# Patient Record
Sex: FEMALE | Race: WHITE | NOT HISPANIC OR LATINO | URBAN - METROPOLITAN AREA
[De-identification: names, ages, dates, MRNs, and addresses within clinical notes are randomized per-mention and may not be internally consistent; named-entity substitution may affect disease eponyms.]

---

## 2017-11-02 ENCOUNTER — OUTPATIENT (OUTPATIENT)
Dept: OUTPATIENT SERVICES | Facility: HOSPITAL | Age: 62
LOS: 1 days | End: 2017-11-02

## 2019-05-15 ENCOUNTER — TRANSCRIPTION ENCOUNTER (OUTPATIENT)
Age: 64
End: 2019-05-15

## 2020-03-02 ENCOUNTER — APPOINTMENT (OUTPATIENT)
Dept: ULTRASOUND IMAGING | Facility: CLINIC | Age: 65
End: 2020-03-02
Payer: MEDICARE

## 2020-03-02 PROCEDURE — 76700 US EXAM ABDOM COMPLETE: CPT

## 2022-10-25 PROBLEM — Z00.00 ENCOUNTER FOR PREVENTIVE HEALTH EXAMINATION: Status: ACTIVE | Noted: 2022-10-25

## 2022-11-11 ENCOUNTER — RESULT REVIEW (OUTPATIENT)
Age: 67
End: 2022-11-11

## 2022-11-28 ENCOUNTER — APPOINTMENT (OUTPATIENT)
Dept: CARDIOLOGY | Facility: CLINIC | Age: 67
End: 2022-11-28
Payer: MEDICARE

## 2022-11-28 ENCOUNTER — NON-APPOINTMENT (OUTPATIENT)
Age: 67
End: 2022-11-28

## 2022-11-28 VITALS
HEART RATE: 73 BPM | WEIGHT: 150 LBS | HEIGHT: 62 IN | DIASTOLIC BLOOD PRESSURE: 84 MMHG | TEMPERATURE: 96.9 F | SYSTOLIC BLOOD PRESSURE: 138 MMHG | OXYGEN SATURATION: 96 % | BODY MASS INDEX: 27.6 KG/M2

## 2022-11-28 DIAGNOSIS — Z78.9 OTHER SPECIFIED HEALTH STATUS: ICD-10-CM

## 2022-11-28 DIAGNOSIS — Z82.49 FAMILY HISTORY OF ISCHEMIC HEART DISEASE AND OTHER DISEASES OF THE CIRCULATORY SYSTEM: ICD-10-CM

## 2022-11-28 PROCEDURE — 93000 ELECTROCARDIOGRAM COMPLETE: CPT

## 2022-11-28 PROCEDURE — 93242 EXT ECG>48HR<7D RECORDING: CPT | Mod: 59

## 2022-11-28 PROCEDURE — 99204 OFFICE O/P NEW MOD 45 MIN: CPT | Mod: 25

## 2022-11-28 NOTE — HISTORY OF PRESENT ILLNESS
[FreeTextEntry1] : Joanna is a 67-year-old female with history of hyperlipidemia, hypertriglyceridemia, hypothyroidism, dizziness, family history premature CAD SUPA mother age 70.\par \par No history of CAD, MI, revascularization, VHD, CHF, TIA, CVA, diabetes, PVD, DVT, PE, arrhythmia, AF.\par \par Patient has palpitations and dizziness with exercise with heart rates up to 120s.  Cardiovascular review of symptoms is negative for exertional chest pain, dyspnea or syncope.  No PND or orthopnea leg edema.  No bleeding or black stool.\par \par No exercise routine.  Patient is walking 30 minutes on occasion.  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

## 2022-11-28 NOTE — ASSESSMENT
[FreeTextEntry1] : Joanna is a 67-year-old female with medical history detailed above and active medical issues including:\par \par - Palpitations, dizziness, abnormal baseline EKG, multiple CAD risk factors.  Coronary CTA and coronary calcium score ordered to access for obstructive CAD and risk stratification.  Echocardiogram ordered to evaluate for structural heart disease, carotid and abdominal ultrasound to evaluate for PAD.  Zio patch 1 week heart monitor started today.\par \par - Hyperlipidemia, hypertriglyceridemia, declines starting statin therapy.  Coronary calcium score ordered to assess for risk stratification.  Repeat labs ordered.\par \par - Hypothyroid on Synthroid\par \par - Family history of premature CAD mother SUPA age 70\par \par Advised patient to follow active lifestyle with regular cardiovascular exercise. Patient educated on heart healthy diet. Recommend increased oral hydration with electrolyte suppliment drinks, avoid excess alcohol and caffeine.  Patient is aware to call with any symptoms or concerns.\par \par Cardiology follow-up after noninvasive testing.\par \par \par

## 2022-12-08 ENCOUNTER — APPOINTMENT (OUTPATIENT)
Dept: ENDOCRINOLOGY | Facility: CLINIC | Age: 67
End: 2022-12-08

## 2022-12-08 VITALS
OXYGEN SATURATION: 98 % | TEMPERATURE: 97.6 F | DIASTOLIC BLOOD PRESSURE: 80 MMHG | BODY MASS INDEX: 27.79 KG/M2 | HEIGHT: 62 IN | SYSTOLIC BLOOD PRESSURE: 138 MMHG | HEART RATE: 60 BPM | WEIGHT: 151 LBS

## 2022-12-08 PROCEDURE — 99204 OFFICE O/P NEW MOD 45 MIN: CPT

## 2022-12-08 NOTE — HISTORY OF PRESENT ILLNESS
[FreeTextEntry1] : pt is coming in coming in for a consultation on hypothyroidism. \par This is a pleasant 67-year-old white female who has a past medical history of a primary hypothyroidism presents for initial evaluation of her thyroid test.  According to the patient she has a history of thyroid disease for the past 16 years and was maintained on Synthroid 125 mcg tablets daily.  However she had a blood test done in September which showed that the TSH was abnormal at 0.176 and as a result of this her dose was reduced to 125 mcg 6 days in a week by her PCP.  Had a repeat level drawn in November and that showed that the TSH level is now within normal range.  Patient claimed that she is good she is compliant with her medication but she had an episode where she lost approximately 8 pounds in September.  She was feeling very anxious.  She denies any palpitations chest pain difficulty swallowing or hoarseness.  She did notice increased hair loss and heat intolerance.  Patient is physically active and does exercise on a daily basis.  There is no history of any head or neck radiation skin rash or joint pains.  Patient claimed that she does not take any other prescribed medication but she does have a history of elevated cholesterol  for which she is being followed by the cardiologist.  And reports that there is a strong family history of thyroid disease both her parents has had thyroid problem and she has a sister also who has a thyroid issue.

## 2022-12-08 NOTE — PHYSICAL EXAM
[Alert] : alert [Well Nourished] : well nourished [Healthy Appearance] : healthy appearance [Normal Sclera/Conjunctiva] : normal sclera/conjunctiva [PERRL] : pupils equal, round and reactive to light [Normal Outer Ear/Nose] : the ears and nose were normal in appearance [No Neck Mass] : no neck mass was observed [Thyroid Not Enlarged] : the thyroid was not enlarged [No Respiratory Distress] : no respiratory distress [Clear to Auscultation] : lungs were clear to auscultation bilaterally [Normal S1, S2] : normal S1 and S2 [Normal Rate] : heart rate was normal [Regular Rhythm] : with a regular rhythm [Carotids Normal] : carotid pulses were normal with no bruits [No Edema] : no peripheral edema [Not Tender] : non-tender [Soft] : abdomen soft [No HSM] : no hepato-splenomegaly [Normal Supraclavicular Nodes] : no supraclavicular lymphadenopathy [Normal Anterior Cervical Nodes] : no anterior cervical lymphadenopathy [Normal Posterior Cervical Nodes] : no posterior cervical lymphadenopathy [No CVA Tenderness] : no ~M costovertebral angle tenderness [No Stigmata of Cushings Syndrome] : no stigmata of Cushings Syndrome [Normal Gait] : normal gait [No Clubbing, Cyanosis] : no clubbing  or cyanosis of the fingernails [No Joint Swelling] : no joint swelling seen [No Rash] : no rash [No Skin Lesions] : no skin lesions [No Motor Deficits] : the motor exam was normal [Normal Reflexes] : deep tendon reflexes were 2+ and symmetric [No Tremors] : no tremors [Oriented x3] : oriented to person, place, and time [de-identified] : Deferred [FreeTextEntry1] : Deferred [de-identified] : Deferred [de-identified] : Normal body hair

## 2022-12-08 NOTE — REVIEW OF SYSTEMS
[Fatigue] : fatigue [Recent Weight Gain (___ Lbs)] : recent weight gain: [unfilled] lbs [Heat Intolerance] : heat intolerance [Negative] : Heme/Lymph

## 2022-12-08 NOTE — ASSESSMENT
[FreeTextEntry1] : Middle-aged white female who has a past medical history of primary hypothyroidism maintained for a long period of time on Synthroid 125 mcg tablets daily.  However she had a blood test done on September 29, 2022 which showed a suppressed TSH of 0.176 her medication was adjusted and it was changed to 1 tablet 6 days in a week repeat lab test from 11/15/2022 show a normal TSH of 1.120 Free T3 of 3.0 and a free T4 of 1.6.  Patient is well compliant with her medication.  She is now clinically euthyroid.  Recommendation\par 1.  I have advised the patient to continue with the Synthroid 125 mcg tablets from Monday to Saturday and to skip on Sundays.\par 2 we will repeat her thyroid profile in approximately 2 months time after which she will return to the office for a follow-up evaluation.  The plan was discussed in detail with the patient thank you Breath sounds clear and equal bilaterally.

## 2022-12-14 ENCOUNTER — APPOINTMENT (OUTPATIENT)
Dept: CARDIOLOGY | Facility: CLINIC | Age: 67
End: 2022-12-14
Payer: MEDICARE

## 2022-12-14 PROCEDURE — 93244 EXT ECG>48HR<7D REV&INTERPJ: CPT

## 2022-12-28 ENCOUNTER — APPOINTMENT (OUTPATIENT)
Dept: CARDIOLOGY | Facility: CLINIC | Age: 67
End: 2022-12-28

## 2023-01-24 ENCOUNTER — APPOINTMENT (OUTPATIENT)
Dept: CARDIOLOGY | Facility: CLINIC | Age: 68
End: 2023-01-24
Payer: MEDICARE

## 2023-01-24 PROCEDURE — 93306 TTE W/DOPPLER COMPLETE: CPT

## 2023-01-26 ENCOUNTER — APPOINTMENT (OUTPATIENT)
Dept: CARDIOLOGY | Facility: CLINIC | Age: 68
End: 2023-01-26
Payer: MEDICARE

## 2023-01-26 VITALS
BODY MASS INDEX: 46.46 KG/M2 | TEMPERATURE: 97.8 F | SYSTOLIC BLOOD PRESSURE: 168 MMHG | HEART RATE: 72 BPM | OXYGEN SATURATION: 95 % | DIASTOLIC BLOOD PRESSURE: 90 MMHG | WEIGHT: 254 LBS

## 2023-01-26 VITALS — DIASTOLIC BLOOD PRESSURE: 80 MMHG | SYSTOLIC BLOOD PRESSURE: 130 MMHG

## 2023-01-26 PROCEDURE — 99215 OFFICE O/P EST HI 40 MIN: CPT

## 2023-01-26 NOTE — ASSESSMENT
[FreeTextEntry1] : Joanna is a 67-year-old female with medical history detailed above and active medical issues including:\par \par - No anginal symptoms, nonobstructive coronary CTA, moderate coronary calcium score started on statin therapy, repeat labs ordered\par \par - Hyperlipidemia, hypertriglyceridemia, start statin therapy.  \par \par - MVP, mild-moderate MR, normal LVEF, echo Jan 2023\par \par - Hypothyroid on Synthroid\par \par - Family history of premature CAD mother SUPA age 70\par \par Advised patient to follow active lifestyle with regular cardiovascular exercise. Patient educated on heart healthy diet. Recommend increased oral hydration with electrolyte suppliment drinks, avoid excess alcohol and caffeine.  Patient is aware to call with any symptoms or concerns.\par \par Cardiology follow-up 1 year same-day echocardiogram\par \par Joanna will follow up with Dr. Urmila Nails for primary care\par \par \par

## 2023-01-26 NOTE — HISTORY OF PRESENT ILLNESS
[FreeTextEntry1] : Joanna is a 67-year-old female with history of hyperlipidemia, hypertriglyceridemia, hypothyroidism, dizziness, family history premature CAD SUPA mother age 70.\par \par No history of CAD, MI, revascularization, VHD, CHF, TIA, CVA, diabetes, PVD, DVT, PE, arrhythmia, AF.\par \par Patient has dizziness with head position likely vertigo.  Patient has palpitations and dizziness with exercise with heart rates up to 120s.  Cardiovascular review of symptoms is negative for exertional chest pain, dyspnea or syncope.  No PND or orthopnea leg edema.  No bleeding or black stool.\par \par No exercise routine.  Patient is walking 30 minutes on occasion.  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 \par \par EKG Nov 2022 sinus rhythm PRWP, age undetermined anterior MI

## 2023-02-14 ENCOUNTER — APPOINTMENT (OUTPATIENT)
Dept: ENDOCRINOLOGY | Facility: CLINIC | Age: 68
End: 2023-02-14
Payer: MEDICARE

## 2023-02-14 VITALS
HEART RATE: 68 BPM | DIASTOLIC BLOOD PRESSURE: 82 MMHG | HEIGHT: 62 IN | TEMPERATURE: 96.4 F | SYSTOLIC BLOOD PRESSURE: 134 MMHG | WEIGHT: 151 LBS | OXYGEN SATURATION: 95 % | BODY MASS INDEX: 27.79 KG/M2

## 2023-02-14 PROCEDURE — 99213 OFFICE O/P EST LOW 20 MIN: CPT

## 2023-02-14 RX ORDER — ROSUVASTATIN CALCIUM 10 MG/1
10 TABLET, FILM COATED ORAL
Qty: 90 | Refills: 3 | Status: DISCONTINUED | COMMUNITY
Start: 2023-01-26 | End: 2023-02-14

## 2023-02-14 NOTE — ASSESSMENT
[FreeTextEntry1] : Middle-aged white female who with a past medical history of primary hypothyroidism and who is currently stable and euthyroid.  She recently had a blood test done on January 23 her TSH is 1.110, free T4 is 1.76, TPO antibody slightly elevated at 96.  She also had a sonogram of the thyroid in November 2022 which showed 2 unchanged thyroid nodules on the right side all measuring between 0.5 to 1 cm in size with no focal calcifications.  The thyroid gland itself is very atrophic and heterogeneous.  I suspect that the patient most likely has chronic Hashimoto's disease which probably is the cause of his underlying thyroid condition.  Recommendation\par 1.  Patient is advised to continue her current Synthroid 125 mcg tablets daily from Monday to Saturday except on Sundays.\par 2.  The importance of diet exercise and weight loss was again stressed upon the patient.\par 3.  Patient will obtain a repeat blood test in approximately 6 months time and will return for follow-up evaluation.  She will also require a follow-up sonogram of the thyroid by the end of the year.\par The plan was discussed in detail with the patient.
none

## 2023-02-14 NOTE — HISTORY OF PRESENT ILLNESS
[FreeTextEntry1] : 67-year-old white female who has a past medical history of primary hypothyroidism mild hyperlipidemia who is currently maintained on Synthroid 125 mcg tablets 6 days a week.  Patient presents for routine follow-up and evaluation.  Patient denies any significant symptoms except for weight loss of 15 pounds for the past 1 year on a strict diet and exercise regimen.  Otherwise her review of systems is negative.  She denies difficulty swallowing neck pain or hoarseness.  She claims that she has been sleeping much better her energy level is good and she is walking and exercising on a daily basis.  She does complain of occasional heat intolerance.  Her bowel habits are normal.  She is compliant with the medication and takes it on a daily basis in the morning

## 2023-03-01 ENCOUNTER — NON-APPOINTMENT (OUTPATIENT)
Age: 68
End: 2023-03-01

## 2023-03-01 ENCOUNTER — APPOINTMENT (OUTPATIENT)
Dept: OPHTHALMOLOGY | Facility: CLINIC | Age: 68
End: 2023-03-01
Payer: MEDICARE

## 2023-03-01 PROCEDURE — 92004 COMPRE OPH EXAM NEW PT 1/>: CPT

## 2023-07-12 ENCOUNTER — RX RENEWAL (OUTPATIENT)
Age: 68
End: 2023-07-12

## 2023-09-13 ENCOUNTER — APPOINTMENT (OUTPATIENT)
Dept: ENDOCRINOLOGY | Facility: CLINIC | Age: 68
End: 2023-09-13

## 2023-09-20 ENCOUNTER — APPOINTMENT (OUTPATIENT)
Dept: ENDOCRINOLOGY | Facility: CLINIC | Age: 68
End: 2023-09-20
Payer: MEDICARE

## 2023-09-20 VITALS
HEART RATE: 86 BPM | SYSTOLIC BLOOD PRESSURE: 138 MMHG | OXYGEN SATURATION: 95 % | DIASTOLIC BLOOD PRESSURE: 90 MMHG | HEIGHT: 62 IN | WEIGHT: 151 LBS | TEMPERATURE: 98.1 F | BODY MASS INDEX: 27.79 KG/M2 | RESPIRATION RATE: 14 BRPM

## 2023-09-20 PROCEDURE — 99213 OFFICE O/P EST LOW 20 MIN: CPT

## 2023-09-20 RX ORDER — LEVOTHYROXINE SODIUM 125 UG/1
125 TABLET ORAL DAILY
Qty: 90 | Refills: 3 | Status: ACTIVE | COMMUNITY
Start: 2023-09-20 | End: 1900-01-01

## 2023-09-20 RX ORDER — LEVOTHYROXINE SODIUM 0.12 MG/1
125 TABLET ORAL
Qty: 90 | Refills: 1 | Status: DISCONTINUED | COMMUNITY
Start: 2023-07-12 | End: 2023-09-20

## 2024-02-29 PROBLEM — E78.5 ELEVATED LIPIDS: Status: ACTIVE | Noted: 2022-11-28

## 2024-03-07 ENCOUNTER — APPOINTMENT (OUTPATIENT)
Dept: CARDIOLOGY | Facility: CLINIC | Age: 69
End: 2024-03-07
Payer: MEDICARE

## 2024-03-07 VITALS
SYSTOLIC BLOOD PRESSURE: 128 MMHG | OXYGEN SATURATION: 96 % | WEIGHT: 150 LBS | BODY MASS INDEX: 27.6 KG/M2 | HEART RATE: 65 BPM | HEIGHT: 62 IN | DIASTOLIC BLOOD PRESSURE: 80 MMHG

## 2024-03-07 DIAGNOSIS — R42 DIZZINESS AND GIDDINESS: ICD-10-CM

## 2024-03-07 DIAGNOSIS — E78.5 HYPERLIPIDEMIA, UNSPECIFIED: ICD-10-CM

## 2024-03-07 DIAGNOSIS — E04.2 NONTOXIC MULTINODULAR GOITER: ICD-10-CM

## 2024-03-07 DIAGNOSIS — E78.2 MIXED HYPERLIPIDEMIA: ICD-10-CM

## 2024-03-07 DIAGNOSIS — E03.9 HYPOTHYROIDISM, UNSPECIFIED: ICD-10-CM

## 2024-03-07 DIAGNOSIS — I34.0 NONRHEUMATIC MITRAL (VALVE) INSUFFICIENCY: ICD-10-CM

## 2024-03-07 PROCEDURE — G2211 COMPLEX E/M VISIT ADD ON: CPT

## 2024-03-07 PROCEDURE — 99214 OFFICE O/P EST MOD 30 MIN: CPT

## 2024-03-07 PROCEDURE — 93306 TTE W/DOPPLER COMPLETE: CPT

## 2024-03-07 NOTE — HISTORY OF PRESENT ILLNESS
[FreeTextEntry1] : Joanna is a 69-year-old female with history of hyperlipidemia, hypertriglyceridemia, hypothyroidism, dizziness, family history premature CAD SUPA mother age 70.  No history of CAD, MI, revascularization, VHD, CHF, TIA, CVA, diabetes, PVD, DVT, PE, arrhythmia, AF.  Cardiovascular review of symptoms is negative for exertional chest pain, dyspnea, palpitations, dizziness or syncope.  No PND or orthopnea leg edema.  No bleeding or black stool.    No exercise routine.  Patient is walking 30 minutes on occasion.  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  Echocardiogram March 2024 LVEF 65%, mild-moderate MR, mild TR, normal RVSP.  Coronary CTA and coronary calcium score Jan 2023, moderate risk coronary calcium score 367, , RCA 88, obstructive coronaries  Echocardiogram EF 55 to 60%, posterior MVP with mild-moderate MR, mild TR.  Carotid and abdominal ultrasound Jan 2023, mild nonobstructive plaque, normal abdominal aortic size.   EKG Nov 2022 sinus rhythm PRWP, age undetermined anterior MI

## 2024-03-07 NOTE — ASSESSMENT
[FreeTextEntry1] : Joanna is a 69-year-old female with medical history detailed above and active medical issues including:  - No anginal symptoms, nonobstructive coronary CTA Jan 2024, moderate coronary calcium score started on statin therapy  - Hyperlipidemia, hypertriglyceridemia, continue Crestor 5 Mg daily, repeat labs ordered  - Mild-moderate MR, normal LVEF, echo March 2024  - Hypothyroid on Synthroid  - Family history of premature CAD mother SUPA age 70  Advised patient to follow active lifestyle with regular cardiovascular exercise. Patient educated on heart healthy diet. Recommend increased oral hydration with electrolyte suppliment drinks, avoid excess alcohol and caffeine.  Patient is aware to call with any symptoms or concerns.  Cardiology follow-up 1 year same-day echocardiogram  Joanna will follow up with Dr. Urmila Nails for primary care  Total time spent 45 minutes, reviewing of test results, chart information, patient discussion, physical exam and completion of chart documentation.

## 2024-03-08 RX ORDER — ROSUVASTATIN CALCIUM 10 MG/1
10 TABLET, FILM COATED ORAL
Qty: 45 | Refills: 1 | Status: DISCONTINUED | COMMUNITY
End: 2024-03-08

## 2024-04-23 ENCOUNTER — APPOINTMENT (OUTPATIENT)
Dept: ENDOCRINOLOGY | Facility: CLINIC | Age: 69
End: 2024-04-23

## 2024-05-30 RX ORDER — ROSUVASTATIN CALCIUM 5 MG/1
5 TABLET, FILM COATED ORAL
Qty: 90 | Refills: 3 | Status: ACTIVE | COMMUNITY
Start: 2024-03-08 | End: 1900-01-01

## 2024-11-01 ENCOUNTER — TRANSCRIPTION ENCOUNTER (OUTPATIENT)
Age: 69
End: 2024-11-01

## 2025-02-05 ENCOUNTER — APPOINTMENT (OUTPATIENT)
Dept: OPHTHALMOLOGY | Facility: CLINIC | Age: 70
End: 2025-02-05
Payer: MEDICARE

## 2025-02-05 ENCOUNTER — NON-APPOINTMENT (OUTPATIENT)
Age: 70
End: 2025-02-05

## 2025-02-05 PROCEDURE — 92014 COMPRE OPH EXAM EST PT 1/>: CPT

## 2025-03-07 ENCOUNTER — APPOINTMENT (OUTPATIENT)
Dept: OPHTHALMOLOGY | Facility: CLINIC | Age: 70
End: 2025-03-07
Payer: COMMERCIAL

## 2025-03-07 ENCOUNTER — NON-APPOINTMENT (OUTPATIENT)
Age: 70
End: 2025-03-07

## 2025-03-07 PROCEDURE — 92004 COMPRE OPH EXAM NEW PT 1/>: CPT

## 2025-03-07 PROCEDURE — 92015 DETERMINE REFRACTIVE STATE: CPT

## 2025-03-31 ENCOUNTER — APPOINTMENT (OUTPATIENT)
Dept: CARDIOLOGY | Facility: CLINIC | Age: 70
End: 2025-03-31
Payer: MEDICARE

## 2025-03-31 ENCOUNTER — NON-APPOINTMENT (OUTPATIENT)
Age: 70
End: 2025-03-31

## 2025-03-31 VITALS
OXYGEN SATURATION: 95 % | HEIGHT: 62 IN | HEART RATE: 92 BPM | BODY MASS INDEX: 29.44 KG/M2 | SYSTOLIC BLOOD PRESSURE: 124 MMHG | DIASTOLIC BLOOD PRESSURE: 92 MMHG | WEIGHT: 160 LBS

## 2025-03-31 DIAGNOSIS — E78.5 HYPERLIPIDEMIA, UNSPECIFIED: ICD-10-CM

## 2025-03-31 DIAGNOSIS — R42 DIZZINESS AND GIDDINESS: ICD-10-CM

## 2025-03-31 DIAGNOSIS — E03.9 HYPOTHYROIDISM, UNSPECIFIED: ICD-10-CM

## 2025-03-31 DIAGNOSIS — E04.2 NONTOXIC MULTINODULAR GOITER: ICD-10-CM

## 2025-03-31 DIAGNOSIS — I34.0 NONRHEUMATIC MITRAL (VALVE) INSUFFICIENCY: ICD-10-CM

## 2025-03-31 PROCEDURE — 99215 OFFICE O/P EST HI 40 MIN: CPT

## 2025-03-31 PROCEDURE — 93306 TTE W/DOPPLER COMPLETE: CPT

## 2025-04-08 ENCOUNTER — APPOINTMENT (OUTPATIENT)
Dept: CT IMAGING | Facility: CLINIC | Age: 70
End: 2025-04-08
Payer: MEDICARE

## 2025-04-08 PROCEDURE — 75574 CT ANGIO HRT W/3D IMAGE: CPT

## 2025-04-17 ENCOUNTER — APPOINTMENT (OUTPATIENT)
Dept: CARDIOLOGY | Facility: CLINIC | Age: 70
End: 2025-04-17
Payer: MEDICARE

## 2025-04-17 VITALS
WEIGHT: 160 LBS | BODY MASS INDEX: 29.44 KG/M2 | HEART RATE: 77 BPM | SYSTOLIC BLOOD PRESSURE: 138 MMHG | HEIGHT: 62 IN | OXYGEN SATURATION: 96 % | DIASTOLIC BLOOD PRESSURE: 92 MMHG

## 2025-04-17 DIAGNOSIS — E78.2 MIXED HYPERLIPIDEMIA: ICD-10-CM

## 2025-04-17 DIAGNOSIS — E04.2 NONTOXIC MULTINODULAR GOITER: ICD-10-CM

## 2025-04-17 DIAGNOSIS — R42 DIZZINESS AND GIDDINESS: ICD-10-CM

## 2025-04-17 DIAGNOSIS — I34.0 NONRHEUMATIC MITRAL (VALVE) INSUFFICIENCY: ICD-10-CM

## 2025-04-17 PROCEDURE — 99215 OFFICE O/P EST HI 40 MIN: CPT

## 2025-05-24 ENCOUNTER — RX RENEWAL (OUTPATIENT)
Age: 70
End: 2025-05-24

## 2025-05-27 DIAGNOSIS — E78.5 HYPERLIPIDEMIA, UNSPECIFIED: ICD-10-CM

## 2025-05-27 DIAGNOSIS — E78.2 MIXED HYPERLIPIDEMIA: ICD-10-CM

## 2025-08-29 ENCOUNTER — RX RENEWAL (OUTPATIENT)
Age: 70
End: 2025-08-29